# Patient Record
Sex: FEMALE | Race: BLACK OR AFRICAN AMERICAN | Employment: PART TIME | URBAN - METROPOLITAN AREA
[De-identification: names, ages, dates, MRNs, and addresses within clinical notes are randomized per-mention and may not be internally consistent; named-entity substitution may affect disease eponyms.]

---

## 2022-08-23 ENCOUNTER — HOSPITAL ENCOUNTER (EMERGENCY)
Age: 22
Discharge: HOME | End: 2022-08-23
Attending: STUDENT IN AN ORGANIZED HEALTH CARE EDUCATION/TRAINING PROGRAM
Payer: COMMERCIAL

## 2022-08-23 ENCOUNTER — APPOINTMENT (OUTPATIENT)
Dept: GENERAL RADIOLOGY | Age: 22
End: 2022-08-23
Attending: EMERGENCY MEDICINE
Payer: COMMERCIAL

## 2022-08-23 VITALS
SYSTOLIC BLOOD PRESSURE: 123 MMHG | HEART RATE: 75 BPM | DIASTOLIC BLOOD PRESSURE: 84 MMHG | OXYGEN SATURATION: 100 % | TEMPERATURE: 98.3 F | RESPIRATION RATE: 18 BRPM

## 2022-08-23 DIAGNOSIS — S80.01XA CONTUSION OF RIGHT KNEE, INITIAL ENCOUNTER: Primary | ICD-10-CM

## 2022-08-23 PROCEDURE — 99283 EMERGENCY DEPT VISIT LOW MDM: CPT

## 2022-08-23 PROCEDURE — 73562 X-RAY EXAM OF KNEE 3: CPT

## 2022-08-23 RX ORDER — IBUPROFEN 800 MG/1
800 TABLET ORAL EVERY 8 HOURS
Qty: 15 TABLET | Refills: 0 | Status: SHIPPED | OUTPATIENT
Start: 2022-08-23 | End: 2022-08-28

## 2022-08-23 NOTE — ED PROVIDER NOTES
EMERGENCY DEPARTMENT HISTORY AND PHYSICAL EXAM    Date: 8/23/2022  Patient Name: Sarita Sotelo    History of Presenting Illness     Chief Complaint   Patient presents with    Leg Pain         History Provided By: Patient and Patient's Mother      Additional History (Context): Sarita Sotelo is a 23 y.o. female with No significant past medical history who presents with knee pain after a large pallet from work fell onto her knee. She was helping unloading truck. Pain is moderate to severe. Denies numbness weakness prior surgery. Denies possibility of pregnancy. Injury occurred just prior to arrival.    PCP: None        Past History     Past Medical History:  No past medical history on file. Past Surgical History:  No past surgical history on file. Family History:  No family history on file. Social History: Allergies:  Not on File      Review of Systems   Review of Systems   Musculoskeletal:  Positive for arthralgias and gait problem. Skin:  Negative for wound. Neurological:  Negative for weakness and numbness. Hematological:  Does not bruise/bleed easily. All Other Systems Negative  Physical Exam     Vitals:    08/23/22 1627   BP: 123/84   Pulse: 75   Resp: 18   Temp: 98.3 °F (36.8 °C)   SpO2: 100%     Physical Exam  Vitals and nursing note reviewed. Constitutional:       Appearance: She is well-developed. HENT:      Head: Normocephalic and atraumatic. Right Ear: External ear normal.      Left Ear: External ear normal.      Nose: Nose normal.   Eyes:      Conjunctiva/sclera: Conjunctivae normal.      Pupils: Pupils are equal, round, and reactive to light. Neck:      Vascular: No JVD. Trachea: No tracheal deviation. Cardiovascular:      Rate and Rhythm: Normal rate and regular rhythm. Heart sounds: Normal heart sounds. No murmur heard. No friction rub. No gallop. Pulmonary:      Effort: Pulmonary effort is normal. No respiratory distress.       Breath sounds: Normal breath sounds. No wheezing or rales. Abdominal:      General: Bowel sounds are normal. There is no distension. Palpations: Abdomen is soft. There is no mass. Tenderness: There is no abdominal tenderness. There is no guarding or rebound. Musculoskeletal:         General: Swelling, tenderness and signs of injury present. Normal range of motion. Cervical back: Normal range of motion and neck supple. Comments:   Right knee:   Extension: 0  Flexion: 120  Negative modified Moreno's negative Lachman anterior drawer. No effusion. No varus valgus stressors. No joint line tenderness. Nontender ankle. DP PT pulses palpable. Skin:     General: Skin is warm and dry. Findings: No rash. Neurological:      Mental Status: She is alert and oriented to person, place, and time. Cranial Nerves: No cranial nerve deficit. Deep Tendon Reflexes: Reflexes are normal and symmetric. Psychiatric:         Behavior: Behavior normal.            Diagnostic Study Results     Labs -   No results found for this or any previous visit (from the past 12 hour(s)). Radiologic Studies -   XR KNEE RT 3 V   Final Result   1. No acute fracture or dislocation. CT Results  (Last 48 hours)      None          CXR Results  (Last 48 hours)      None              Medical Decision Making   I am the first provider for this patient. I reviewed the vital signs, available nursing notes, past medical history, past surgical history, family history and social history. Vital Signs-Reviewed the patient's vital signs. Records Reviewed: Nursing Notes    Procedures:  Procedures    Provider Notes (Medical Decision Making): X-ray shows no acute fracture. There is no effusion. She is able to extend her lower leg.   Differential would include fracture subluxation dislocation ACL injury    Placed in an immobilizer for stability and pain control send home with prescription for ibuprofen and have her follow-up with Ortho.    Immobilizer placed by nurse to right knee; excellent position. N/v intact before and after application. MED RECONCILIATION:  No current facility-administered medications for this encounter. Current Outpatient Medications   Medication Sig    ibuprofen (MOTRIN) 800 mg tablet Take 1 Tablet by mouth every eight (8) hours for 5 days. Disposition:  home    DISCHARGE NOTE:   5:13 PM    Pt has been reexamined. Patient has no new complaints, changes, or physical findings. Care plan outlined and precautions discussed. Results of exam, x-rays were reviewed with the patient. All medications were reviewed with the patient; will d/c home with ibuprofen. All of pt's questions and concerns were addressed. Patient was instructed and agrees to follow up with ortho, as well as to return to the ED upon further deterioration. Patient is ready to go home. Follow-up Information       Follow up With Specialties Details Why Contact Info    Spike Walter MD Orthopedic Surgery Schedule an appointment as soon as possible for a visit in 2 days As needed [address] [facility] [address]      [facility] EMERGENCY DEPT Emergency Medicine  If symptoms worsen return immediately [address]  [phone]            Current Discharge Medication List        START taking these medications    Details   ibuprofen (MOTRIN) 800 mg tablet Take 1 Tablet by mouth every eight (8) hours for 5 days. Qty: 15 Tablet, Refills: 0  Start date: 8/23/2022, End date: 8/28/2022               Diagnosis     Clinical Impression:   1.  Contusion of right knee, initial encounter

## 2022-08-23 NOTE — Clinical Note
[address] [place] [address] Dr [facility] EMERGENCY DEPT  [id number] [facility] [id number] [phone]    Work/School Note    Date: 8/23/2022    To Whom It May concern:    Tomasz Stapleton was seen and treated today in the emergency room by the following provider(s):  Attending Provider: Usama العلي DO  Physician Assistant: LUZ Zhu. Tomasz Stapleton is excused from work/school on 08/23/22 and 08/24/22. She is medically clear to return to work/school on 8/25/2022.        Sincerely,          LUZ Coffman

## 2022-08-23 NOTE — ED NOTES
I have reviewed discharge instructions with the patient. The patient verbalized understanding. Discharge medications reviewed with patient and appropriate educational materials and side effects teaching were provided. Patient left ED via wheelchair with family.

## 2022-08-23 NOTE — ED TRIAGE NOTES
22 yo F to ED for RLE pain after having items fall on her while she was unloading a truck. PMS intact, no obvious deformities. PT reports it hurts to bear weight on leg.  No co any other injuries

## 2022-08-24 ENCOUNTER — OFFICE VISIT (OUTPATIENT)
Dept: ORTHOPEDIC SURGERY | Age: 22
End: 2022-08-24
Payer: COMMERCIAL

## 2022-08-24 VITALS
WEIGHT: 121 LBS | HEART RATE: 100 BPM | OXYGEN SATURATION: 99 % | BODY MASS INDEX: 21.44 KG/M2 | HEIGHT: 63 IN | TEMPERATURE: 98 F

## 2022-08-24 DIAGNOSIS — S80.01XA CONTUSION OF RIGHT KNEE, INITIAL ENCOUNTER: Primary | ICD-10-CM

## 2022-08-24 DIAGNOSIS — S83.91XA SPRAIN OF RIGHT KNEE, UNSPECIFIED LIGAMENT, INITIAL ENCOUNTER: ICD-10-CM

## 2022-08-24 PROCEDURE — 99204 OFFICE O/P NEW MOD 45 MIN: CPT | Performed by: SPECIALIST

## 2022-08-24 NOTE — LETTER
NOTIFICATION RETURN TO WORK / SCHOOL    8/24/2022 10:46 AM    Ms. [facility]  [address] [address] [place] [address]      To Whom It May Concern:    [facility] is currently under the care of Mallika Tremayne Feliciano. She will be out of work until September 26 th, 2022. If there are questions or concerns please have the patient contact our office.         Sincerely,      Damián Casper MD

## 2022-08-24 NOTE — PROGRESS NOTES
Patient: Charly Duke                MRN: [id number]       SSN: [id number]  YOB: 2000        AGE: 23 y.o. SEX: female    PCP: None  08/24/22    Chief Complaint   Patient presents with    New Patient     Right knee pain       HISTORY:  Charly Duke is a 23 y.o. female who reportedly sustained a work injury to her right knee on 08/23/22. She fell off a two step stool while she was trying to take down a tote bin off the top of a tall display stack. The bins all fell and landed on her right knee. She felt pain right away and she was unable to walk normally. She does not recall hearing or feeling a pop. She experienced swelling and bruising almost immediately. Pain Assessment  8/24/2022   Location of Pain Knee   Location Modifiers Right   Severity of Pain 5   Quality of Pain Dull; Sharp   Duration of Pain Persistent   Frequency of Pain Constant   Aggravating Factors (No Data)   Aggravating Factors Comment bending leg   Relieving Factors Elevation   Result of Injury Yes   Work-Related Injury Yes   How long out of work? 1 day   Type of Injury Fall     Occupation, etc:  Ms. Lei Lazo works as a  at [employer]. Her job requires lifting and moving grocery boxes. She lives in [place] with her mom, dad, and fraternal twin sister. Ms. Lei Lazo weighs 121 lbs and is 5'3\" tall. No results found for: HBA1C, TJR8OMDR, BNI2WCOH, SQW2RAND  Weight Metrics 8/24/2022   Weight 121 lb   BMI 21.43 kg/m2       There is no problem list on file for this patient.     REVIEW OF SYSTEMS:    Constitutional Symptoms: Negative   Eyes: Negative   Ears, Nose, Throat and Mouth: Negative   Cardiovascular: Negative   Respiratory: Negative   Genitourinary: Per HPI   Gastrointestinal: Per HPI   Integumentary (Skin and/or Breast): Negative   Musculoskeletal: Per HPI   Endocrine/Rheumatologic: Negative   Neurological: Per HPI   Hematology/Lymphatic: Negative    Allergic/Immunologic: Negative   Phychiatric: Negative    Social History     Socioeconomic History    Marital status: SINGLE     Spouse name: Not on file    Number of children: Not on file    Years of education: Not on file    Highest education level: Not on file   Occupational History    Not on file   Tobacco Use    Smoking status: Never    Smokeless tobacco: Never   Substance and Sexual Activity    Alcohol use: Never    Drug use: Never    Sexual activity: Not on file   Other Topics Concern    Not on file   Social History Narrative    Not on file     Social Determinants of Health     Financial Resource Strain: Not on file   Food Insecurity: Not on file   Transportation Needs: Not on file   Physical Activity: Not on file   Stress: Not on file   Social Connections: Not on file   Intimate Partner Violence: Not on file   Housing Stability: Not on file      No Known Allergies   Current Outpatient Medications   Medication Sig    ibuprofen (MOTRIN) 800 mg tablet Take 1 Tablet by mouth every eight (8) hours for 5 days. No current facility-administered medications for this visit. PHYSICAL EXAMINATION:  Visit Vitals  Pulse 100   Temp 98 °F (36.7 °C) (Temporal)   Ht 5' 3\" (1.6 m)   Wt 121 lb (54.9 kg)   SpO2 99%   BMI 21.43 kg/m²    Appearance: Alert, well appearing and pleasant patient who is in no distress, oriented to person, place/time, and who follows commands. HEENT: Jose Neff hears well, does not require hearing aids. Her sclera of the eyes are non-icteric. She is breathing normally and no respiratory accessory muscle use is noted. No JVD present and Neck ROM within normal limits. Psychiatric: Affect and mood are appropriate. Oriented x3  Cardiovascular/Peripheral Vascular: Normal pulses to each foot. Integumentary: No rashes. Warm and normal color. No drainage.    Gait: antalgic  Sensory Exam: Intact/Normal Sensation    Lymphatic: No evidence of Lymphedema  Vascular:       Pulses: palpable  Varicosities none  Wounds/Abrasion: None Present  Neuro: Negative, no tremors  ORTHO EXAMINATION:  Examination Right knee Left knee   Skin Intact Intact   Range of motion 130-0 110-0   Effusion - -   Medial joint line tenderness - +   Lateral joint line tenderness - -   Popliteal tenderness - -   Osteophytes palpable - -   Morenos - -   Patella crepitus - -   Anterior drawer - -   Lateral laxity - -   Medial laxity - -   Varus deformity - -   Valgus deformity - -   Pretibial edema - -   Calf tenderness - -   4 cm tender suprapatellar ecchymosis   Wearing a short hinged brace  + limp  RADIOGRAPHS:  XR RIGHT KNEE 08/23/22 [facility] ED  IMPRESSION  No acute fracture or dislocation. IMPRESSION:    ICD-10-CM ICD-9-CM    1. Contusion of right knee, initial encounter  S80. 01XA 924.11 REFERRAL TO PHYSICAL THERAPY      AMB SUPPLY ORDER      2. Sprain of right knee, unspecified ligament, initial encounter  S83. 91XA 844.9 REFERRAL TO PHYSICAL THERAPY      AMB SUPPLY ORDER        PLAN:  She will start a brief course of outpatient physical therapy. A note was provided to keep her out of work for four weeks followed by return to full duty. Crutches were provided for support. We will consider ordering an MRI scan if pain continues. She will follow up as needed.       Scribed by Kyaw Orosco ([address]) as dictated by Kristen Benavides MD

## 2022-08-25 ENCOUNTER — HOSPITAL ENCOUNTER (OUTPATIENT)
Dept: PHYSICAL THERAPY | Age: 22
Discharge: HOME | End: 2022-08-25
Attending: SPECIALIST
Payer: COMMERCIAL

## 2022-08-25 PROCEDURE — 97110 THERAPEUTIC EXERCISES: CPT

## 2022-08-25 PROCEDURE — 97161 PT EVAL LOW COMPLEX 20 MIN: CPT

## 2022-08-25 NOTE — PROGRESS NOTES
[facility] [address] [facility]  [address] [facility], [address] [place] [address], [address] [place] [address]  [phone] fax      Plan of Care/ Statement of Necessity for Physical Therapy Services    Patient name: Diego Reyna Start of Care: 2022   Referral source: Randi Kimball MD : 2000    Medical Diagnosis: Pain in right knee [M25.561]  Payor: Tammy Side / Plan: Susie Myles PPO / Product Type: PPO /  Onset Date:2022    Treatment Diagnosis: Right knee pain   Prior Hospitalization: see medical history Provider#: [id number]   Medications: Verified on Patient summary List    Comorbidities: none   Prior Level of Function: Independent with all ADLs     The Plan of Care and following information is based on the information from the initial evaluation. Assessment/ key information: Pt is a 23 y.o. female who presents with c/o right knee pain status post a fall at work from step stool on 2022. Patient reports totes fell on her right thigh and knee resulting in right knee pain. Functional deficits include: decreased walking and standing tolerance requiring use of single crutch and weight bearing as tolerated. Upon exam, Pt exhibited decreased ROM limited by pain, decreased LE strength, high tender to palpate over lateral joint line, quadriceps, adductor, and IT band. Pt would benefit from skilled PT to address above deficits to improve Pt's function and ability to return to work related duties and daily activities.     Evaluation Complexity History LOW Complexity : Zero comorbidities / personal factors that will impact the outcome / POC; Examination LOW Complexity : 1-2 Standardized tests and measures addressing body structure, function, activity limitation and / or participation in recreation  ;Presentation LOW Complexity : Stable, uncomplicated  ;Clinical Decision Making MEDIUM Complexity : FOTO score of 26-74  Overall Complexity Rating: LOW   Problem List: pain affecting function, decrease ROM, decrease strength, edema affecting function, impaired gait/ balance, decrease ADL/ functional abilitiies, decrease activity tolerance, decrease flexibility/ joint mobility, and decrease transfer abilities   Treatment Plan may include any combination of the following: Therapeutic exercise, Therapeutic activities, Neuromuscular re-education, Physical agent/modality, Gait/balance training, Manual therapy, Patient education, and Self Care training  Patient / Family readiness to learn indicated by: asking questions, trying to perform skills, and interest  Persons(s) to be included in education: patient (P)  Barriers to Learning/Limitations: None  Patient Goal (s): I want to return to work and my daily activities.   Patient Self Reported Health Status: excellent  Rehabilitation Potential: good    Short Term Goals: To be accomplished in 1 week  - Goal: Pt to be compliant with initial HEP to improve ease of performing daily activities  Status at last note/certification: Established and reviewed with Pt  Long Term Goals: To be accomplished in 10 treatments  - Goal: Patient will demonstrate 0-150 degrees AROM right knee to increase ease of ADLs  Status at last note/certification: right knee 3-47 degrees AROM painfree  - Goal: Patient will tolerate ambulation time of 60 min without symptom exacerbation to facilitate improved community ambulation. Status at last note/certification: 5 min  - Goal: Patient will increase FOTO score to at least 73 pts to demonstrate increased functional mobility. Status at last note/certification: FOTO 33 pts   -Goal: Patient will improve SLS to 30 seconds to decrease risk for falls. Status at last note/certification: not tested - assess when full WB  -Goal: Patient will be able to lift and carry 15-20# to carry groceries and perform household chores and tasks.   Status at last note/certification: not tested    Frequency / Duration: Patient to be seen 2 times per week for 5 weeks. Patient/ Caregiver education and instruction: Diagnosis, prognosis, self care, activity modification, and exercises   [x]  Plan of care has been reviewed with JERARDO West, PT 8/25/2022 1:45 PM  ________________________________________________________________________    I certify that the above Therapy Services are being furnished while the patient is under my care. I agree with the treatment plan and certify that this therapy is necessary.     [de-identified] Signature:____________Date:_________TIME:________     Hollis Ortiz MD  ** Signature, Date and Time must be completed for valid certification **      Please sign and return to In [address] [place] [address] [address]  [address] [place] [address], [address] [place] [address], [address] [place] [address],[facility] [address]  [phone] fax

## 2022-08-25 NOTE — PROGRESS NOTES
PT DAILY TREATMENT NOTE/KNEE EVAL     Patient Name: Tamanna Bahena  Date:2022  : 2000  [x]  Patient  Verified  Payor: Jadon Theodore / Plan: Cheyanne Dodge PPO / Product Type: PPO /    In time:11:25  Out time:12:00  Total Treatment Time (min): 35  Visit #: 1 of 10    Medicare/BCBS Only   Total Timed Codes (min):   1:1 Treatment Time:       Treatment Area: Pain in right knee [M25.561]    SUBJECTIVE  Pain Level (0-10 scale): 4  []constant []intermittent []improving []worsening []no change since onset    Any medication changes, allergies to medications, adverse drug reactions, diagnosis change, or new procedure performed?: [x] No    [] Yes (see summary sheet for update)  Subjective functional status/changes:     PLOF: Independent with all ADLs  Limitations to PLOF: Walking tolerance: 5 minutes; standing tolerance:   Mechanism of Injury: ,  I was on a step stool moving totes over my head and the totes fell on my knee. Squatting and bending really hurts right now. Use a single crutch to move around. 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 \"      OBJECTIVE/EXAMINATION  Mobility: single crutch  Self Care: independent    20 min [x]Eval                  []Re-Eval       15 min Therapeutic Exercise:  [] See flow sheet :   Rationale: increase ROM and increase strength to improve the patients ability to perform daily activities with ease           With   [x] TE   [] TA   [] neuro   [] other: Patient Education: [x] Review HEP    [] Progressed/Changed HEP based on:   [] positioning   [] body mechanics   [] transfers   [] heat/ice application    [] other:      Other Objective/Functional Measures:     Physical Therapy Evaluation - Knee    Posture: [] Varus    [] Valgus    [] Recurvatum        [] Tibial Torsion    [] Foot Supination [] Foot Pronation    Describe:    Gait:  [] Normal    [] Abnormal    [] Antalgic    [] NWB    Device:    Describe:    ROM / Strength  [] Unable to assess               PROM                   Strength (1-5)    Left Right Left Right   Hip Flexion        Extension        Abduction        Adduction       Knee Flexion 150 47(no pain)  NT    Extension -2 3  NT   Ankle Plantarflexion        Dorsiflexion           Flexibility: [] Unable to assess at this time  Hamstrings:    (L) Tightness= [] WNL   [] Min   [] Mod   [] Severe    (R) Tightness= [] WNL   [] Min   [] Mod   [] Severe  Quadriceps:    (L) Tightness= [] WNL   [] Min   [] Mod   [] Severe    (R) Tightness= [] WNL   [] Min   [] Mod   [] Severe  Gastroc:      (L) Tightness= [] WNL   [] Min   [] Mod   [] Severe    (R) Tightness= [] WNL   [] Min   [] Mod   [] Severe  Other:    Palpation: high tender to palpate over quadriceps, adductor, and IT band   Neg/Pos  Neg/Pos  Neg/Pos   Joint Line POS Quad tendon POS Patellar ligament    Patella  Fibular head  Pes Anserinus    Tibial tubercle  Hamstring tendons  Infrapatellar fat pad      Optional Tests:    Girth Measurements:     6 Cm above joint line   Cm below joint line  Cm at joint line   Left 36 32.7 34.8   Right  36.8 33 35.3     Pain Level (0-10 scale) post treatment: 4    ASSESSMENT/Changes in Function: See POC    Patient will continue to benefit from skilled PT services to modify and progress therapeutic interventions, address functional mobility deficits, address ROM deficits, address strength deficits, analyze and address soft tissue restrictions, analyze and cue movement patterns, analyze and modify body mechanics/ergonomics, assess and modify postural abnormalities, address imbalance/dizziness, and instruct in home and community integration to attain remaining goals.      [x]  See Plan of Care  []  See progress note/recertification  []  See Discharge Summary         Progress towards goals / Updated goals:  See POC    PLAN  []  Upgrade activities as tolerated     [x]  Continue plan of care  []  Update interventions per flow sheet       []  Discharge due to:_  []  Other:_      Rigo Arzate, PT 8/25/2022  11:27 AM

## 2022-09-08 ENCOUNTER — HOSPITAL ENCOUNTER (OUTPATIENT)
Dept: PHYSICAL THERAPY | Age: 22
Discharge: HOME | End: 2022-09-08
Attending: SPECIALIST
Payer: COMMERCIAL

## 2022-09-08 PROCEDURE — 97112 NEUROMUSCULAR REEDUCATION: CPT

## 2022-09-08 PROCEDURE — 97140 MANUAL THERAPY 1/> REGIONS: CPT

## 2022-09-08 PROCEDURE — 97110 THERAPEUTIC EXERCISES: CPT

## 2022-09-08 NOTE — PROGRESS NOTES
PT DAILY TREATMENT NOTE     Patient Name: Tomasz Stapleton  Date:2022  : 2000  [x]  Patient  Verified  Payor: Ivory Burt / Plan: Clarissa Griffiths RPN / Product Type: Commerical /    In time:133 pm   Out time:220 pm   Total Treatment Time (min): 52  Visit #: 2 of 10     Treatment Area: Pain in right knee [M25.561]    SUBJECTIVE  Pain Level (0-10 scale): 5/10   Any medication changes, allergies to medications, adverse drug reactions, diagnosis change, or new procedure performed?: [x] No    [] Yes (see summary sheet for update)  Subjective functional status/changes:   [] No changes reported  Patient reports increased soreness and difficulty bending her knee today. OBJECTIVE    Modality rationale: Patient declined modalities today.      Min Type Additional Details    [] Estim:  []Unatt       []IFC  []Premod                        []Other:  []w/ice   []w/heat  Position:  Location:    [] Estim: []Att    []TENS instruct  []NMES                    []Other:  []w/US   []w/ice   []w/heat  Position:  Location:    []  Traction: [] Cervical       []Lumbar                       [] Prone          []Supine                       []Intermittent   []Continuous Lbs:  [] before manual  [] after manual    []  Ultrasound: []Continuous   [] Pulsed                           []1MHz   []3MHz W/cm2:  Location:    []  Iontophoresis with dexamethasone         Location: [] Take home patch   [] In clinic    []  Ice     []  heat  []  Ice massage  []  Laser   []  Anodyne Position:  Location:    []  Laser with stim  []  Other:  Position:  Location:    []  Vasopneumatic Device    []  Right     []  Left  Pre-treatment girth:  Post-treatment girth:  Measured at (location):  Pressure:       [] lo [] med [] hi   Temperature: [] lo [] med [] hi   [] Skin assessment post-treatment:  []intact []redness- no adverse reaction []redness - adverse reaction:    22 min Therapeutic Exercise:  [] See flow sheet :   Rationale: increase ROM and increase strength to improve the patients overall muscle endurance and activity tolerance for ADL's and functional tasks. min Therapeutic Activity:  []  See flow sheet :   Rationale: increase strength and improve coordination  to improve the patients ability to safely perform dynamic activities and to improve functional performance of  ADL's. 15 min Neuromuscular Re-education:  []  See flow sheet :   Rationale: increase strength, improve coordination, and improve balance  to improve the patients ability to perform activities with good form, stability and proprioception. 10  min Manual Therapy:  STM to right knee    The manual therapy interventions were performed at a separate and distinct time from the therapeutic activities interventions. Rationale: decrease pain, increase ROM, increase tissue extensibility, and decrease edema  to assist with performing ADL's with ease. With   [] TE   [] TA   [] neuro   [] other: Patient Education: [x] Review HEP    [] Progressed/Changed HEP based on:   [] positioning   [] body mechanics   [] transfers   [] heat/ice application    [] other:      Other Objective/Functional Measures:   Initiated exercises set a initial evaluation. Verbal cues required for proper form. Pain Level (0-10 scale) post treatment: 3/10     ASSESSMENT/Changes in Function:   Patient is progressing as expected towards goals. Patient performed exercises, as per flow sheet, to assist with right knee strength and ROM. Patient tolerated today's exercises with no increase in right knee pain. Swelling at right knee decreased following STM. Patient responded well to today's session, as evident by, decreased right knee discomfort and improved ROM. Patient will continue to benefit from skilled PT services to modify and progress therapeutic interventions, address functional mobility deficits, address ROM deficits, address strength deficits, analyze and address soft tissue restrictions, analyze and cue movement patterns, analyze and modify body mechanics/ergonomics, assess and modify postural abnormalities, and instruct in home and community integration to attain remaining goals. []  See Plan of Care  []  See proPatient is progressing well towards goals. Progressed exercises, as per flow sheet. Patient responded well to today's session, as evident by, no increase in pain. richard note/recertification  []  See Discharge Summary         Progress towards goals / Updated goals:  - Goal: Pt to be compliant with initial HEP to improve ease of performing daily activities  Status at last note/certification: Established and reviewed with Pt  Long Term Goals: To be accomplished in 10 treatments  - Goal: Patient will demonstrate 0-150 degrees AROM right knee to increase ease of ADLs  Status at last note/certification: right knee 3-47 degrees AROM painfree  - Goal: Patient will tolerate ambulation time of 60 min without symptom exacerbation to facilitate improved community ambulation. Status at last note/certification: 5 min  - Goal: Patient will increase FOTO score to at least 73 pts to demonstrate increased functional mobility. Status at last note/certification: FOTO 33 pts   -Goal: Patient will improve SLS to 30 seconds to decrease risk for falls. Status at last note/certification: not tested - assess when full WB  -Goal: Patient will be able to lift and carry 15-20# to carry groceries and perform household chores and tasks.   Status at last note/certification: not tested    PLAN  [x]  Upgrade activities as tolerated     [x]  Continue plan of care  []  Update interventions per flow sheet       []  Discharge due to:_  []  Other:_      Concepcion Franz PTA 9/8/2022  3:00 PM    Future Appointments   Date Time Provider Pola Chu   9/9/2022  1:30 PM Hilda Crouch PTA [facility]   9/12/2022  1:30 PM Jeanne Yoon DPT [facility]   9/14/2022  1:30 PM Jace Morales, DPT [facility]

## 2022-09-09 ENCOUNTER — HOSPITAL ENCOUNTER (OUTPATIENT)
Dept: PHYSICAL THERAPY | Age: 22
Discharge: HOME | End: 2022-09-09
Attending: SPECIALIST
Payer: COMMERCIAL

## 2022-09-09 PROCEDURE — 97110 THERAPEUTIC EXERCISES: CPT

## 2022-09-09 PROCEDURE — 97112 NEUROMUSCULAR REEDUCATION: CPT

## 2022-09-09 PROCEDURE — 97140 MANUAL THERAPY 1/> REGIONS: CPT

## 2022-09-09 NOTE — PROGRESS NOTES
PT DAILY TREATMENT NOTE     Patient Name: Adrianne Chauhan  Date:2022  : 2000  [x]  Patient  Verified  Payor: Maria Teresa Anglin / Plan: Sb Almendarez RPN / Product Type: Commerical /    In time:133 pm   Out time:240 pm   Total Treatment Time (min): 79   Visit #: 3 of 10     Treatment Area: Pain in right knee [M25.561]    SUBJECTIVE  Pain Level (0-10 scale): 0/10   Any medication changes, allergies to medications, adverse drug reactions, diagnosis change, or new procedure performed?: [x] No    [] Yes (see summary sheet for update)  Subjective functional status/changes:   [] No changes reported  Patient reports some soreness after last visit but feeling better today. OBJECTIVE    Modality rationale: decrease edema, decrease inflammation, decrease pain, and increase tissue extensibility to improve the patients ability to assist with performing ADL's and functional tasks without limitations.     Min Type Additional Details    [] Estim:  []Unatt       []IFC  []Premod                        []Other:  []w/ice   []w/heat  Position:  Location:    [] Estim: []Att    []TENS instruct  []NMES                    []Other:  []w/US   []w/ice   []w/heat  Position:  Location:    []  Traction: [] Cervical       []Lumbar                       [] Prone          []Supine                       []Intermittent   []Continuous Lbs:  [] before manual  [] after manual    []  Ultrasound: []Continuous   [] Pulsed                           []1MHz   []3MHz W/cm2:  Location:    []  Iontophoresis with dexamethasone         Location: [] Take home patch   [] In clinic   10 [x]  Ice     []  heat  []  Ice massage  []  Laser   []  Anodyne Position:reclined  Location: right knee    []  Laser with stim  []  Other:  Position:  Location:    []  Vasopneumatic Device    []  Right     []  Left  Pre-treatment girth:  Post-treatment girth:  Measured at (location):  Pressure:       [] lo [] med [] hi   Temperature: [] lo [] med [] hi   [] Skin assessment post-treatment:  []intact []redness- no adverse reaction []redness - adverse reaction:    25 min Therapeutic Exercise:  [] See flow sheet :   Rationale: increase ROM and increase strength to improve the patients overall muscle endurance and activity tolerance for ADL's and functional tasks. min Therapeutic Activity:  []  See flow sheet :   Rationale: increase strength and improve coordination  to improve the patients ability to safely perform dynamic activities and to improve functional performance of  ADL's.     22 min Neuromuscular Re-education:  []  See flow sheet :   Rationale: increase strength, improve coordination, and improve balance  to improve the patients ability to perform activities with good form, stability and proprioception. 10  min Manual Therapy:  STM/effleurage to right knee, patellar mobs   The manual therapy interventions were performed at a separate and distinct time from the therapeutic activities interventions. Rationale: decrease pain, increase ROM, increase tissue extensibility, and decrease edema  to assist with performing ADL's with ease. With   [] TE   [] TA   [] neuro   [] other: Patient Education: [x] Review HEP    [] Progressed/Changed HEP based on:   [] positioning   [] body mechanics   [] transfers   [] heat/ice application    [] other:      Other Objective/Functional Measures: Added HR and LAQ    Right knee AROM: -1-130 deg (pain free)    Pain Level (0-10 scale) post treatment: 0/10     ASSESSMENT/Changes in Function:   Patient is progressing as expected towards goals. Progressed exercises, as per flow sheet, to assist with increasing right quad strength. Right knee ROM has improved since IE. Attempted 4\" step ups. Patient experienced increased right knee pain afterwards. Will hold until WB tolerance improves. Patient tolerated all other exercises well. Continue to progress exercises as tolerated.  Patient will continue to benefit from skilled PT services to modify and progress therapeutic interventions, address functional mobility deficits, address ROM deficits, address strength deficits, analyze and address soft tissue restrictions, analyze and cue movement patterns, analyze and modify body mechanics/ergonomics, assess and modify postural abnormalities, and instruct in home and community integration to attain remaining goals. []  See Plan of Care  []  See progress note/recertification  []  See Discharge Summary         Progress towards goals / Updated goals:  - Goal: Pt to be compliant with initial HEP to improve ease of performing daily activities  Status at last note/certification: Established and reviewed with Pt  Current: Pt complaint with HEP twice a day. 9/9/2022  Long Term Goals: To be accomplished in 10 treatments  - Goal: Patient will demonstrate 0-150 degrees AROM right knee to increase ease of ADLs  Status at last note/certification: right knee 3-47 degrees AROM pain free  Current: Right knee AROM: -1-130 deg (pain free). 9/9/2022    - Goal: Patient will tolerate ambulation time of 60 min without symptom exacerbation to facilitate improved community ambulation. Status at last note/certification: 5 min    - Goal: Patient will increase FOTO score to at least 73 pts to demonstrate increased functional mobility. Status at last note/certification: FOTO 33 pts   Current: Ongoing, will assess at next progress note. 9/9/2022    -Goal: Patient will improve SLS to 30 seconds to decrease risk for falls. Status at last note/certification: not tested - assess when full WB  Current: WB has improved since IE. SLS has not been attempted. 9/9/2022    -Goal: Patient will be able to lift and carry 15-20# to carry groceries and perform household chores and tasks.   Status at last note/certification: not tested    PLAN  [x]  Upgrade activities as tolerated     [x]  Continue plan of care  [x]  Update interventions per flow sheet       []  Discharge due to:_  []  Other:_ Rich Skinner, JERARDO 9/9/2022  3:00 PM    Future Appointments   Date Time Provider Pola Kimberley   9/12/2022  1:30 PM Tawana Rosen, DPT [facility]   9/14/2022  1:30 PM Michael Morales, YOSVANYT [facility]

## 2022-09-12 ENCOUNTER — HOSPITAL ENCOUNTER (OUTPATIENT)
Dept: PHYSICAL THERAPY | Age: 22
Discharge: HOME | End: 2022-09-12
Attending: SPECIALIST
Payer: COMMERCIAL

## 2022-09-12 PROCEDURE — 97530 THERAPEUTIC ACTIVITIES: CPT

## 2022-09-12 PROCEDURE — 97140 MANUAL THERAPY 1/> REGIONS: CPT

## 2022-09-12 PROCEDURE — 97110 THERAPEUTIC EXERCISES: CPT

## 2022-09-12 NOTE — PROGRESS NOTES
PT DAILY TREATMENT NOTE     Patient Name: Diego Job  Date:2022  : 2000  [x]  Patient  Verified  Payor: Tammy Side / Plan: Susie Myles RPN / Product Type: Commerical /    In time:1:36P   Out time: 2:21P  Total Treatment Time (min):  45  Visit #: 4 of 10     Treatment Area: Pain in right knee [M25.561]    SUBJECTIVE  Pain Level (0-10 scale): 0   Any medication changes, allergies to medications, adverse drug reactions, diagnosis change, or new procedure performed?: [x] No    [] Yes (see summary sheet for update)  Subjective functional status/changes:   [] No changes reported  Patient reports improvements in overall pain levels and walking tolerance since beginning PT     OBJECTIVE    24 min Therapeutic Exercise:  [x] See flow sheet :   Rationale: increase ROM and increase strength to improve the patients overall muscle endurance and activity tolerance for ADL's and functional tasks. 13 min Therapeutic Activity:  [x]  See flow sheet :   Rationale: increase strength and improve coordination  to improve the patients ability to safely perform dynamic activities and to improve functional performance of  ADL's.    8 min Manual Therapy: (supine)  STM/TPr to right distal adductors/HS w/ inf<>sup and med<>lat patellar mobs    The manual therapy interventions were performed at a separate and distinct time from the therapeutic activities interventions.   Rationale: decrease pain, increase ROM, increase tissue extensibility, and decrease trigger points to increase tolerance to therex and overall return to PLOF           With   [x] TE   [x] TA   [] neuro   [] other: Patient Education: [x] Review HEP    [] Progressed/Changed HEP based on:   [] positioning   [] body mechanics   [] transfers   [] heat/ice application    [] other:      Other Objective/Functional Measures:  Progressed ex program per flow sheet - added mini squat, fwd step up, 1/2 stance w/ 8 in step, HS stretch      Pain Level (0-10 scale) post treatment: 0/10     ASSESSMENT/Changes in Function:   Patient tolerates today's session w/ overall good tolerance - min discomfort reported w/ repeated reps of fwd step that subsided upon completion of set. Leaves session w/ report of no pain. Formal reassessment to completed at next scheduled follow up. Patient will continue to benefit from skilled PT services to modify and progress therapeutic interventions, address functional mobility deficits, address ROM deficits, address strength deficits, analyze and address soft tissue restrictions, analyze and cue movement patterns, analyze and modify body mechanics/ergonomics, assess and modify postural abnormalities, and instruct in home and community integration to attain remaining goals. [x]  See Plan of Care  []  See progress note/recertification  []  See Discharge Summary         Progress towards goals / Updated goals:  - Goal: Pt to be compliant with initial HEP to improve ease of performing daily activities  Status at last note/certification: Established and reviewed with Pt  Current: Pt complaint with HEP twice a day. 9/9/2022    Long Term Goals: To be accomplished in 10 treatments  - Goal: Patient will demonstrate 0-150 degrees AROM right knee to increase ease of ADLs  Status at last note/certification: right knee 3-47 degrees AROM pain free  Current: Right knee AROM: -1-130 deg (pain free). 9/9/2022    - Goal: Patient will tolerate ambulation time of 60 min without symptom exacerbation to facilitate improved community ambulation. Status at last note/certification: 5 min    - Goal: Patient will increase FOTO score to at least 73 pts to demonstrate increased functional mobility. Status at last note/certification: FOTO 33 pts   Current: Ongoing, will assess at next progress note. 9/9/2022    -Goal: Patient will improve SLS to 30 seconds to decrease risk for falls.   Status at last note/certification: not tested - assess when full WB  Current: WB has improved since IE. SLS has not been attempted. 9/9/2022    -Goal: Patient will be able to lift and carry 15-20# to carry groceries and perform household chores and tasks.   Status at last note/certification: not tested    PLAN  [x]  Upgrade activities as tolerated     [x]  Continue plan of care  [x]  Update interventions per flow sheet       []  Discharge due to:_  []  Other:_      Arianne Valdez DPT 9/12/2022  3:00 PM    Future Appointments   Date Time Provider Pola Chu   9/14/2022  1:30 PM Irma Morales DPT [facility]

## 2022-09-14 ENCOUNTER — HOSPITAL ENCOUNTER (OUTPATIENT)
Dept: PHYSICAL THERAPY | Age: 22
Discharge: HOME | End: 2022-09-14
Attending: SPECIALIST
Payer: COMMERCIAL

## 2022-09-14 PROCEDURE — 97535 SELF CARE MNGMENT TRAINING: CPT

## 2022-09-14 PROCEDURE — 97140 MANUAL THERAPY 1/> REGIONS: CPT

## 2022-09-14 PROCEDURE — 97530 THERAPEUTIC ACTIVITIES: CPT

## 2022-09-14 PROCEDURE — 97110 THERAPEUTIC EXERCISES: CPT

## 2022-09-14 NOTE — PROGRESS NOTES
PT DAILY TREATMENT NOTE     Patient Name: Yokasta Gage  Date:2022  : 2000  [x]  Patient  Verified  Payor: Harjinder Myers / Plan: Dominique Goins RPN / Product Type: Commerical /    In time:1:35P   Out time: 2:25P  Total Treatment Time (min):  50  Visit #: 5 of 10     Treatment Area: Pain in right knee [M25.561]    SUBJECTIVE  Pain Level (0-10 scale): 6  Any medication changes, allergies to medications, adverse drug reactions, diagnosis change, or new procedure performed?: [x] No    [] Yes (see summary sheet for update)  Subjective functional status/changes:   [] No changes reported  Patient reports a 70% improvement in current condition since beginning skilled PT services. Reports improvements in overall overall pain levels and walking tolerance/independence since beginning PT, however continues to experience right knee symptom flare ups w/ squatting/lifting, ascending/descending stairs, and performing full work responsibilities. States attempting to return to work w/ modified duties for first time earlier this morning with experience an increase in pain levels, in addition to altered gait pattern. OBJECTIVE    8 min Therapeutic Exercise:  [x] See flow sheet :   Rationale: increase ROM and increase strength to improve the patients overall muscle endurance and activity tolerance for ADL's and functional tasks.       15 min Self Care Management:  []  See flow sheet : pt ed on relevant anatomy/patho, pain science, activity/postural modifications and appropriate return to work task progressions in order prevent flare up and continue progression of returning to PLOF of completing all ADL/IADL task completion/job responsibilities with greatest ease and efficiency   Rationale:  increase pt education/awareness   to improve the patients ability to prevent flare up and continue progression of returning to PLOF of completing all ADL/IADL task completion/job responsibilities with greatest ease and efficiency      15 min Therapeutic Activity:  [x]  See flow sheet : FOTO/Goal Reassessment/Squat/Sit<>Stand   Rationale: increase strength and improve coordination  to improve the patients ability to safely perform dynamic activities and to improve functional performance of  ADL's.    12 min Manual Therapy: (supine)  STM/TPr to right distal adductors/HS w/ inf<>sup and med<>lat patellar mobs    The manual therapy interventions were performed at a separate and distinct time from the therapeutic activities interventions. Rationale: decrease pain, increase ROM, increase tissue extensibility, and decrease trigger points to increase tolerance to therex and overall return to PLOF           With   [x] TE   [x] TA   [] neuro   [x] other:self care management Patient Education: [x] Review HEP    [] Progressed/Changed HEP based on:   [] positioning   [] body mechanics   [] transfers   [] heat/ice application    [] other:      Other Objective/Functional Measures:  See Goals Below   X5 sit<>stand: 18 sec   Stair Navigation: NT secondary to pt presentation/symptom flare up today  Functional Squat: increased left LE weightshift, decreased hip hinge and increased time in order to complete   Palpation: Soft tissue restrictions/TTP right distal adductors/HS   Joint Mobility: Hypomobility w/ AP/PA tibfemoral glides and inf<>sup, med<>lat patellar glides     Pain Level (0-10 scale) post treatment: 3/10     ASSESSMENT/Changes in Function:   Patient reports a 70% improvement in current condition since beginning skilled PT services. Reports improvements in overall overall pain levels and walking tolerance/independence, however continues to experience right knee symptom flare ups w/ squatting/lifting, ascending/descending stairs, and performing full work responsibilities.  States attempting to return to work w/ modified duties for first time earlier this morning with experience an increase in pain levels, in addition to altered gait pattern. Objective findings correlate with subjective report noted above - with patient demonstrating progress in overall functional status since IE, however continues to lack optimal strength and mobility required to perform ADLs and vocational responsibilities at [employer] [facility] [employer]. Patient will continue to benefit from skilled PT services to modify and progress therapeutic interventions, address functional mobility deficits, address ROM deficits, address strength deficits, analyze and address soft tissue restrictions, analyze and cue movement patterns, analyze and modify body mechanics/ergonomics, assess and modify postural abnormalities, and instruct in home and community integration to attain remaining goals. []  See Plan of Care  [x]  See progress note/recertification  []  See Discharge Summary         Progress towards goals / Updated goals:  - Goal: Pt to be compliant with initial HEP to improve ease of performing daily activities  Status at last note/certification: Established and reviewed with Pt  Current: MET: Pt demonstrates/reports compliance w/ prescribed HEP, 2022    Long Term Goals: To be accomplished in 10 treatments  - Goal: Patient will demonstrate 0-150 degrees AROM right knee to increase ease of ADLs  Status at last note/certification: right knee 3-47 degrees AROM pain free  Current: Progressing: Right knee AROM: -1-117 deg (pain free),     - Goal: Patient will tolerate ambulation time of 60 min without symptom exacerbation to facilitate improved community ambulation. Status at last note/certification: 5 min  Current: Progressing: reports 15 min standing tolerance, 22    - Goal: Patient will increase FOTO score to at least 73 pts to demonstrate increased functional mobility. Status at last note/certification: FOTO 33 pts   Current: Progressin pts, 22    -Goal: Patient will improve SLS to 30 seconds to decrease risk for falls.   Status at last note/certification: not tested - assess when full WB  Current: Attempted, however experienced increased pain w/ discontinuing trial, 09/14/22    -Goal: Patient will be able to lift and carry 15-20# to carry groceries and perform household chores and tasks.   Status at last note/certification: not tested  Current: NT secondary to pt presentation/symptom flare up today, 09/14/22    PLAN  [x]  Upgrade activities as tolerated     [x]  Continue plan of care  [x]  Update interventions per flow sheet       []  Discharge due to:_  []  Other:_      Bharathi Call DPT 9/14/2022  3:00 PM    Future Appointments   Date Time Provider Pola Chu   9/14/2022  1:30 PM Eric Panchal DPT [facility]   9/19/2022  1:30 PM Rao Man PTA [facility]   9/22/2022  1:30 PM Viviane Blanton PTA [facility]

## 2022-09-14 NOTE — PROGRESS NOTES
[facility]  [address] [facility], [facility], [address] [place], [place] [address]y [address]  [phone] fax    Physician Update  [x] Progress Note  [] Discharge Summary  Patient name: Mirella Jo Start of Care: 2022   Referral source: Nima Jenkins MD : 2000   Medical/Treatment Diagnosis: Pain in right knee [M25.561]  Payor: Skyler Green / Plan: Joanne Rock / Product Type: Commerical /  Onset Date:2022     Prior Hospitalization: see medical history Provider#: [id number]   Medications: Verified on Patient Summary List    Comorbidities: none  Prior Level of Function:Independent with all ADLs    Visits from Start of Care: 5    Missed Visits: 0    Status at Evaluation:   Pt is a 23 y.o. female who presents with c/o right knee pain status post a fall at work from step stool on 2022. Patient reports totes fell on her right thigh and knee resulting in right knee pain. Functional deficits include: decreased walking and standing tolerance requiring use of single crutch and weight bearing as tolerated. Upon exam, Pt exhibited decreased ROM limited by pain, decreased LE strength, high tender to palpate over lateral joint line, quadriceps, adductor, and IT band. Pt would benefit from skilled PT to address above deficits to improve Pt's function and ability to return to work related duties and daily activities. Progress towards Goals:   - Goal: Pt to be compliant with initial HEP to improve ease of performing daily activities  Status at last note/certification: Established and reviewed with Pt  Current: MET: Pt demonstrates/reports compliance w/ prescribed HEP, 2022     Long Term Goals:  To be accomplished in 10 treatments  - Goal: Patient will demonstrate 0-150 degrees AROM right knee to increase ease of ADLs  Status at last note/certification: right knee 3-47 degrees AROM pain free  Current: Progressing: Right knee AROM: -1-117 deg (pain free),  - Goal: Patient will tolerate ambulation time of 60 min without symptom exacerbation to facilitate improved community ambulation. Status at last note/certification: 5 min  Current: Progressing: reports 15 min standing tolerance, 22     - Goal: Patient will increase FOTO score to at least 73 pts to demonstrate increased functional mobility. Status at last note/certification: FOTO 33 pts   Current: Progressin pts, 22     -Goal: Patient will improve SLS to 30 seconds to decrease risk for falls. Status at last note/certification: not tested - assess when full WB  Current: Attempted, however experienced increased pain w/ discontinuing trial, 22     -Goal: Patient will be able to lift and carry 15-20# to carry groceries and perform household chores and tasks. Status at last note/certification: not tested  Current: NT secondary to pt presentation/symptom flare up today, 22      Other Objective/Functional Measures:  See Goals Above  X5 sit<>stand: 18 sec   Stair Navigation: NT secondary to pt presentation/symptom flare up today  Functional Squat: increased left LE weightshift, decreased hip hinge and increased time in order to complete   Palpation: Soft tissue restrictions/TTP right distal adductors/HS   Joint Mobility: Hypomobility w/ AP/PA tibfemoral glides and inf<>sup, med<>lat patellar glides       Current Status:  Patient reports a 70% improvement in current condition since beginning skilled PT services. Reports improvements in overall overall pain levels and walking tolerance/independence, however continues to experience right knee symptom flare ups w/ squatting/lifting, ascending/descending stairs, and performing full work responsibilities. States attempting to return to work w/ modified duties for first time earlier this morning with experience an increase in pain levels, in addition to altered gait pattern.  Objective findings correlate with subjective report noted above - with patient demonstrating progress in overall functional status since IE, however continues to lack optimal strength and mobility required to perform ADLs and vocational responsibilities at [employer]. Patient will continue to benefit from skilled PT services to modify and progress therapeutic interventions, address functional mobility deficits, address ROM deficits, address strength deficits, analyze and address soft tissue restrictions, analyze and cue movement patterns, analyze and modify body mechanics/ergonomics, assess and modify postural abnormalities, and instruct in home and community integration to attain remaining goals. Goals: to be achieved in 6 weeks:  - Goal: Patient will demonstrate > 130 degrees AROM of right knee flexion to increase ease of ADLs  Status at last note/certification: Progressing: Right knee flex AROM: 117 deg ,   - Goal: Patient will tolerate ambulation time of 60 min without symptom exacerbation to facilitate improved community ambulation. Status at last note/certification: Progressing: reports 15 min standing tolerance, 22  - Goal: Patient will increase FOTO score to at least 73 pts to demonstrate increased functional mobility. Status at last note/certification: Progressin pts, 22  -Goal: Patient will improve SLS to 30 seconds to decrease risk for falls. Status at last note/certification: Attempted, however experienced increased pain w/ discontinuing trial, 22  -Goal: Patient will be able to lift and carry 15-20# to carry groceries and perform household chores and tasks.   Status at last note/certification:  NT secondary to pt presentation/symptom flare up today, 22  -Goal: Patient will demonstrate ability to complete x5 sit<>stand assessment in </=11 sec w/ optimal form and w/o presence of symptom flare up in order to demonstrate a return to PLOF of completing vocational responsibilities with greatest efficiency/ease  Status at last note/certification: 18 sec, increased p!, increased left LE weightshift, decreased hip hinge, 09/14/22      ASSESSMENT/RECOMMENDATIONS:  [x]Continue therapy per initial plan/protocol at a frequency of  2x per week for 6 weeks  []Continue therapy with the following recommended changes:_____________________      _____________________________________________________________________  []Discontinue therapy progressing towards or have reached established goals  []Discontinue therapy due to lack of appreciable progress towards goals  []Discontinue therapy due to lack of attendance or compliance  []Await Physician's recommendations/decisions regarding therapy  []Other:________________________________________________________________    Thank you for this referral. Inocencia Griffiths DPT 9/14/2022 10:31 AM  NOTE TO PHYSICIAN:  [address] AND   FAX TO [facility]: [phone]  If you are unable to process this request in 24 hours please contact our office: [phone]    I have read the above report and request that my patient continue as recommended. I have read the above report and request that my patient continue therapy with the following changes/special instructions:_____________________________________  I have read the above report and request that my patient be discharged from therapy.     [de-identified] Signature:____________Date:_________TIME:________     Tiffany Vitale MD  ** Signature, Date and Time must be completed for valid certification ** 64

## 2022-09-19 ENCOUNTER — HOSPITAL ENCOUNTER (OUTPATIENT)
Dept: PHYSICAL THERAPY | Age: 22
Discharge: HOME | End: 2022-09-19
Attending: SPECIALIST
Payer: COMMERCIAL

## 2022-09-19 PROCEDURE — 97140 MANUAL THERAPY 1/> REGIONS: CPT

## 2022-09-19 PROCEDURE — 97530 THERAPEUTIC ACTIVITIES: CPT

## 2022-09-19 PROCEDURE — 97110 THERAPEUTIC EXERCISES: CPT

## 2022-09-19 NOTE — PROGRESS NOTES
PT DAILY TREATMENT NOTE     Patient Name: Rita Hussein  Date:2022  : 2000  [x]  Patient  Verified  Payor: Trey Escobedo / Plan: Shailesh Salinas RPN / Product Type: Commerical /    In time:1:32  Out time:2:22  Total Treatment Time (min): 50  Visit #:  1 of 8    Medicare/BCBS Only   Total Timed Codes (min):   1:1 Treatment Time:         Treatment Area: Pain in right knee [M25.561]    SUBJECTIVE  Pain Level (0-10 scale): 3  Any medication changes, allergies to medications, adverse drug reactions, diagnosis change, or new procedure performed?: [x] No    [] Yes (see summary sheet for update)  Subjective functional status/changes:   [] No changes reported  \"Still some pain. \"    OBJECTIVE    Modality rationale: decrease edema, decrease inflammation, decrease pain, and increase tissue extensibility to improve the patients ability to perform ADL    Min Type Additional Details    [] Estim:  []Unatt       []IFC  []Premod                        []Other:  []w/ice   []w/heat  Position:  Location:    [] Estim: []Att    []TENS instruct  []NMES                    []Other:  []w/US   []w/ice   []w/heat  Position:  Location:    []  Traction: [] Cervical       []Lumbar                       [] Prone          []Supine                       []Intermittent   []Continuous Lbs:  [] before manual  [] after manual    []  Ultrasound: []Continuous   [] Pulsed                           []1MHz   []3MHz W/cm2:  Location:    []  Iontophoresis with dexamethasone         Location: [] Take home patch   [] In clinic    []  Ice     []  heat  []  Ice massage  []  Laser   []  Anodyne Position:  Location:    []  Laser with stim  []  Other:  Position:  Location:    []  Vasopneumatic Device    []  Right     []  Left  Pre-treatment girth:  Post-treatment girth:  Measured at (location):  Pressure:       [] lo [] med [] hi   Temperature: [] lo [] med [] hi   [x] Skin assessment post-treatment:  [x]intact []redness- no adverse reaction    []redness - adverse reaction:      min []Eval                  []Re-Eval       22 min Therapeutic Exercise:  [x] See flow sheet :   Rationale: increase ROM and increase strength to improve the patients ability to job duties    20 min Therapeutic Activity:  [x]  See flow sheet :   Rationale: increase ROM, increase strength, and improve coordination  to improve the patients ability to  perform functional task such as lift and carry with no difficulty      min Neuromuscular Re-education:  []  See flow sheet :   Rationale: increase ROM, increase strength, improve coordination, improve balance, and increase proprioception  to improve the patients ability to ascend/descend stairs with ease    8 min Manual Therapy:  STM/effleruage passive stretch knee Flexion   The manual therapy interventions were performed at a separate and distinct time from the therapeutic activities interventions. Rationale: decrease pain, increase ROM, increase tissue extensibility, decrease edema , and decrease trigger points to perform ADL     min Gait Training:  ___ feet with ___ device on level surfaces with ___ level of assist   Rationale:     min Self Care/Home Management:    Rationale: increase ROM, increase strength, improve coordination, and increase proprioception  to improve the patients ability to perform ADL          With   [x] TE   [x] TA   [] neuro   [] other: Patient Education: [x] Review HEP    [] Progressed/Changed HEP based on:   [x] positioning   [x] body mechanics   [] transfers   [] heat/ice application    [] other:      Other Objective/Functional Measures:      Pain Level (0-10 scale) post treatment: 0    ASSESSMENT/Changes in Function: Pt completed each right LE strengthening there ex with minimal pain with no difficulty. Pt required cues on lifting crate with correct body mechanics. Pt experienced minimal pain with full knee flexion. Overall pt is progressing towards updated goals. Following manual pain was resolved.     Patient will continue to benefit from skilled PT services to modify and progress therapeutic interventions, address functional mobility deficits, address ROM deficits, address strength deficits, analyze and address soft tissue restrictions, analyze and cue movement patterns, analyze and modify body mechanics/ergonomics, assess and modify postural abnormalities, and instruct in home and community integration to attain remaining goals. []  See Plan of Care  [x]  See progress note/recertification  []  See Discharge Summary         Progress towards goals / Updated goals:  - Goal: Patient will demonstrate > 130 degrees AROM of right knee flexion to increase ease of ADLs  Status at last note/certification: Progressing: Right knee flex AROM: 117 deg ,   - Goal: Patient will tolerate ambulation time of 60 min without symptom exacerbation to facilitate improved community ambulation. Status at last note/certification: Progressing: reports 15 min standing tolerance, 22  - Goal: Patient will increase FOTO score to at least 73 pts to demonstrate increased functional mobility. Status at last note/certification: Progressin pts, 22  -Goal: Patient will improve SLS to 30 seconds to decrease risk for falls. Status at last note/certification: Attempted, however experienced increased pain w/ discontinuing trial, 22  -Goal: Patient will be able to lift and carry 15-20# to carry groceries and perform household chores and tasks.   Status at last note/certification:  NT secondary to pt presentation/symptom flare up today, 22  Current:  15#   2x 25ft  cues for correct body mechanics  22  -Goal: Patient will demonstrate ability to complete x5 sit<>stand assessment in </=11 sec w/ optimal form and w/o presence of symptom flare up in order to demonstrate a return to PLOF of completing vocational responsibilities with greatest efficiency/ease  Status at last note/certification: 18 sec, increased p!, increased left LE weightshift, decreased hip hinge, 09/14/22    PLAN  []  Upgrade activities as tolerated     []  Continue plan of care  []  Update interventions per flow sheet       []  Discharge due to:_  []  Other:_      Violeta Pan PTA 9/19/2022  2:12 PM    Future Appointments   Date Time Provider Pola Chu   9/22/2022  1:30 PM [facility]

## 2022-09-21 ENCOUNTER — OFFICE VISIT (OUTPATIENT)
Dept: ORTHOPEDIC SURGERY | Age: 22
End: 2022-09-21
Payer: COMMERCIAL

## 2022-09-21 DIAGNOSIS — S83.91XD SPRAIN OF RIGHT KNEE, UNSPECIFIED LIGAMENT, SUBSEQUENT ENCOUNTER: Primary | ICD-10-CM

## 2022-09-21 PROCEDURE — 99213 OFFICE O/P EST LOW 20 MIN: CPT | Performed by: SPECIALIST

## 2022-09-21 NOTE — LETTER
NOTIFICATION RETURN TO WORK    9/21/2022 12:47 PM    Ms. [facility]  [address]  [place] [address]      To Whom It May Concern:    [facility] is currently under the care of Mallika Tremayne Clemonsd. She will return to work 9/26/2022 full duty with no restrictions. If there are questions or concerns please have the patient contact our office.         Sincerely,      Amelia Woodard MD

## 2022-09-21 NOTE — PROGRESS NOTES
Patient: Adelaide Wright                MRN: [id number]       SSN: [id number]  YOB: 2000        AGE: 23 y.o. SEX: female    PCP: None  09/21/22    CC: RIGHT KNEE INJURY     HISTORY:  Adelaide Wright is a 23 y.o. female who reportedly sustained a work injury to her right knee on 08/23/22. She fell off a two step stool while she was trying to take down a tote bin off the top of a tall display stack. The bins all fell and landed on her right knee. She felt pain right away and she was unable to walk normally. She does not recall hearing or feeling a pop. She experienced swelling and bruising almost immediately. She is doing much better s/p PT. Pain Assessment  9/21/2022   Location of Pain Knee   Location Modifiers Right   Severity of Pain 0   Quality of Pain -   Duration of Pain -   Frequency of Pain -   Aggravating Factors -   Aggravating Factors Comment -   Relieving Factors -   Result of Injury -   Work-Related Injury -   How long out of work? -   Type of Injury -     Occupation, etc:  Ms. Melania Jacobsen works as a  at [employer]. Her job requires lifting and moving grocery boxes. She lives in [place] with her mom, dad, and fraternal twin sister. Ms. Melania Jacobsen weighs 121 lbs and is 5'3\" tall. No results found for: HBA1C, NGM2SXSF, STB1QUKI, ERO2BJMI  Weight Metrics 8/24/2022   Weight 121 lb   BMI 21.43 kg/m2       There is no problem list on file for this patient.     REVIEW OF SYSTEMS:    Constitutional Symptoms: Negative   Eyes: Negative   Ears, Nose, Throat and Mouth: Negative   Cardiovascular: Negative   Respiratory: Negative   Genitourinary: Per HPI   Gastrointestinal: Per HPI   Integumentary (Skin and/or Breast): Negative   Musculoskeletal: Per HPI   Endocrine/Rheumatologic: Negative   Neurological: Per HPI   Hematology/Lymphatic: Negative    Allergic/Immunologic: Negative   Phychiatric: Negative    Social History     Socioeconomic History    Marital status: SINGLE     Spouse name: Not on file    Number of children: Not on file    Years of education: Not on file    Highest education level: Not on file   Occupational History    Not on file   Tobacco Use    Smoking status: Never    Smokeless tobacco: Never   Vaping Use    Vaping Use: Never used   Substance and Sexual Activity    Alcohol use: Never    Drug use: Never    Sexual activity: Not on file   Other Topics Concern    Not on file   Social History Narrative    Not on file     Social Determinants of Health     Financial Resource Strain: Not on file   Food Insecurity: Not on file   Transportation Needs: Not on file   Physical Activity: Not on file   Stress: Not on file   Social Connections: Not on file   Intimate Partner Violence: Not on file   Housing Stability: Not on file      No Known Allergies   No current outpatient medications on file. No current facility-administered medications for this visit. PHYSICAL EXAMINATION:  ORTHO EXAMINATION:  Examination Right knee Left knee   Skin Intact Intact   Range of motion 130-0 130-0   Effusion - -   Medial joint line tenderness - +   Lateral joint line tenderness - -   Popliteal tenderness - -   Osteophytes palpable - -   Morenos - -   Patella crepitus - -   Anterior drawer - -   Lateral laxity - -   Medial laxity - -   Varus deformity - -   Valgus deformity - -   Pretibial edema - -   Calf tenderness - -   4 cm tender suprapatellar ecchymosis   Wearing a short hinged brace  + limp    RADIOGRAPHS:  XR RIGHT KNEE 08/23/22 [facility]  IMPRESSION  No acute fracture or dislocation. DX: Right knee contusion/sprain--doing better. PLAN: After completing PT, a note was provided for her to go back to work on Monday, 9/26/22. There is no need for surgery at this time. She will follow up as needed.     Scribed by Misa Reeves (Oj Benitez) as dictated by Eulalio Mulligan MD

## 2022-09-22 ENCOUNTER — HOSPITAL ENCOUNTER (OUTPATIENT)
Dept: PHYSICAL THERAPY | Age: 22
Discharge: HOME | End: 2022-09-22
Attending: SPECIALIST
Payer: COMMERCIAL

## 2022-09-22 ENCOUNTER — APPOINTMENT (OUTPATIENT)
Dept: PHYSICAL THERAPY | Age: 22
End: 2022-09-22
Attending: SPECIALIST
Payer: COMMERCIAL

## 2022-09-22 PROCEDURE — 97535 SELF CARE MNGMENT TRAINING: CPT

## 2022-09-22 PROCEDURE — 97530 THERAPEUTIC ACTIVITIES: CPT

## 2022-09-22 PROCEDURE — 97110 THERAPEUTIC EXERCISES: CPT

## 2022-09-22 NOTE — PROGRESS NOTES
PT DAILY TREATMENT NOTE     Patient Name: Mirella Jo  Date:2022  : 2000  [x]  Patient  Verified  Payor: Skyler Green / Plan: Pearl Rasheed RPN / Product Type: Commerical /    In time:1:36P   Out time: 2:16P  Total Treatment Time (min):  40  Visit #: 2 of 8 (authorized visits)    Treatment Area: Pain in right knee [M25.561]    SUBJECTIVE  Pain Level (0-10 scale): 0   Any medication changes, allergies to medications, adverse drug reactions, diagnosis change, or new procedure performed?: [x] No    [] Yes (see summary sheet for update)  Subjective functional status/changes:   [] No changes reported  Patient reports significant improvements in overall functional status in the past week. Reports standing tolerance of 45 min at this time. Returning to work on 22, w/ pt asking should she continue PT at this time. OBJECTIVE    8 min Therapeutic Exercise:  [x] See flow sheet :   Rationale: increase ROM and increase strength to improve the patients overall muscle endurance and activity tolerance for ADL's and functional tasks.       24 min Therapeutic Activity:  [x]  See flow sheet :   Rationale: increase strength and improve coordination  to improve the patients ability to safely perform dynamic activities and to improve functional performance of  ADL's.    8 min Self Care Management:  []  See flow sheet : pt ed (to pt and pt's mother) on relevant anatomy/patho, pain science, activity/postural modifications and appropriate return to work task progressions in order prevent flare up and continue progression of returning to PLOF of completing all ADL/IADL task completion/job responsibilities with greatest ease and efficiency   Rationale:  increase pt education/awareness   to improve the patients ability to prevent flare up and continue progression of returning to PLOF of completing all ADL/IADL task completion/job responsibilities with greatest ease and efficiency          With   [x] TE   [x] TA   [] neuro   [] other: Patient Education: [x] Review HEP    [] Progressed/Changed HEP based on:   [] positioning   [] body mechanics   [] transfers   [] heat/ice application    [x] other: see self care management; Ed pt (and pt's mother) to schedule next appt 2-3 wks from now in order to better gauge pt's status w/ returning to work and w/o in person session/increased ind w/ HEP - both verbally demonstrate knowledge and understanding of this. Other Objective/Functional Measures:  Progressed ex program per flow sheet - added SLS, increased step height during step up activity, added elliptical   Right Knee AROM: 134 deg   Right LE SLS: 6 sec, no pain     Pain Level (0-10 scale) post treatment: 0/10     ASSESSMENT/Changes in Function:   Pt w/ significant improvements toward LTGs evident by objective measurements noted above/below. Completes today's sessions w/o presence of symptom flare up. Extensive pt ed (see above) provided to pt and pt's mother. Will tailor POC w/ possible D/C from services at pt's next scheduled follow up pending presentation. Patient will continue to benefit from skilled PT services to modify and progress therapeutic interventions, address functional mobility deficits, address ROM deficits, address strength deficits, analyze and address soft tissue restrictions, analyze and cue movement patterns, analyze and modify body mechanics/ergonomics, assess and modify postural abnormalities, and instruct in home and community integration to attain remaining goals.      [x]  See Plan of Care  []  See progress note/recertification  []  See Discharge Summary         Progress towards goals / Updated goals:  - Goal: Patient will demonstrate > 130 degrees AROM of right knee flexion to increase ease of ADLs  Status at last note/certification: Progressing: Right knee flex AROM: 117 deg , 9/142022  Current: Met: 134 deg, 09/22/22  - Goal: Patient will tolerate ambulation time of 60 min without symptom exacerbation to facilitate improved community ambulation. Status at last note/certification: Progressing: reports 15 min standing tolerance, 22  Current: Progressin min., 22  - Goal: Patient will increase FOTO score to at least 73 pts to demonstrate increased functional mobility. Status at last note/certification: Progressin pts, 22  -Goal: Patient will improve SLS to 30 seconds to decrease risk for falls. Status at last note/certification: Attempted, however experienced increased pain w/ discontinuing trial, 22  Current: Progressin sec, no pain, 22  -Goal: Patient will be able to lift and carry 15-20# to carry groceries and perform household chores and tasks.   Status at last note/certification:  NT secondary to pt presentation/symptom flare up today, 22  Current: Met: 27# box - pt lifts and carries 48' x2 w/o symptom flare up, 22  -Goal: Patient will demonstrate ability to complete x5 sit<>stand assessment in </=11 sec w/ optimal form and w/o presence of symptom flare up in order to demonstrate a return to PLOF of completing vocational responsibilities with greatest efficiency/ease  Status at last note/certification: 18 sec, increased p!, increased left LE weightshift, decreased hip hinge, 22  Current: Met: 11 sec, optimal B LE even weightshift, 22    PLAN  [x]  Upgrade activities as tolerated     [x]  Continue plan of care  [x]  Update interventions per flow sheet       []  Discharge due to:_  []  Other:_      Bharathi Call DPT 2022  3:00 PM    Future Appointments   Date Time Provider Pola Chu   2022  1:30 PM Melodie Morales DPT [facility]

## 2022-10-11 ENCOUNTER — TELEPHONE (OUTPATIENT)
Dept: PHYSICAL THERAPY | Age: 22
End: 2022-10-11

## 2022-10-26 NOTE — PROGRESS NOTES
[facility] [address], [address] [place] [address], [address] [place], [place] [address]y [address],[address]  [phone] fax    Discharge Summary    Patient name: Keshawn Crespo     Start of Care: 2022  Referral source: Aditya Bain MD    : 2000  Medical/Treatment Diagnosis: Pain in right knee [M25.561]  Payor: Sally Vasquez / Plan: Jodee Medrano / Product Type: Commerical /        Onset Date:2022  Prior Hospitalization: see medical history   Provider#: [id number]  Comorbidities: none  Prior Level of Function:Independent with all ADLs  Medications: Verified on Patient Summary List    Visits from Start of Care: 7    Missed Visits: 0    Reporting Period : 2022 to 2022    Summary of Care:  Patient began to demonstrate improvements in overall functional status and LTGs (evident through measurements noted below and at previous PN) since beginning PT, however pt to be discharged at this time secondary to clinic hold policy. Per last tx, pt returning to work w/ instruction to continue PT on as needed basis. Pt did not return from initiation of hold (>30 days). Progress towards goals: Unable to reassess remaining LTGs secondary to formal D/C not completed   - Goal: Patient will demonstrate > 130 degrees AROM of right knee flexion to increase ease of ADLs  Status at last note/certification: Progressing: Right knee flex AROM: 117 deg ,   Current: Met: 134 deg, 22  - Goal: Patient will tolerate ambulation time of 60 min without symptom exacerbation to facilitate improved community ambulation. Status at last note/certification: Progressing: reports 15 min standing tolerance, 22  Current: Progressin min., 22  - Goal: Patient will increase FOTO score to at least 73 pts to demonstrate increased functional mobility.   Status at last note/certification: Progressin pts, 22  -Goal: Patient will improve SLS to 30 seconds to decrease risk for falls.  Status at last note/certification: Attempted, however experienced increased pain w/ discontinuing trial, 22  Current: Progressin sec, no pain, 22  -Goal: Patient will be able to lift and carry 15-20# to carry groceries and perform household chores and tasks.   Status at last note/certification:  NT secondary to pt presentation/symptom flare up today, 22  Current: Met: 27# box - pt lifts and carries 48' x2 w/o symptom flare up, 22  -Goal: Patient will demonstrate ability to complete x5 sit<>stand assessment in </=11 sec w/ optimal form and w/o presence of symptom flare up in order to demonstrate a return to PLOF of completing vocational responsibilities with greatest efficiency/ease  Status at last note/certification: 18 sec, increased p!, increased left LE weightshift, decreased hip hinge, 22  Current: Met: 11 sec, optimal B LE even weightshift, 22    ASSESSMENT/RECOMMENDATIONS:  [x]Discontinue therapy progressing towards or have reached established goals  []Discontinue therapy due to lack of appreciable progress towards goals  []Discontinue therapy due to lack of attendance or compliance  [x]Other: >30 day hold exceeded     Thank you for this referral.     Mo Pruitt DPT 10/26/2022 3:50 PM